# Patient Record
Sex: MALE | Race: WHITE | NOT HISPANIC OR LATINO
[De-identification: names, ages, dates, MRNs, and addresses within clinical notes are randomized per-mention and may not be internally consistent; named-entity substitution may affect disease eponyms.]

---

## 2019-11-04 ENCOUNTER — APPOINTMENT (OUTPATIENT)
Dept: ORTHOPEDIC SURGERY | Facility: CLINIC | Age: 39
End: 2019-11-04
Payer: COMMERCIAL

## 2019-11-04 DIAGNOSIS — M25.562 PAIN IN RIGHT KNEE: ICD-10-CM

## 2019-11-04 DIAGNOSIS — M25.561 PAIN IN RIGHT KNEE: ICD-10-CM

## 2019-11-04 PROBLEM — Z00.00 ENCOUNTER FOR PREVENTIVE HEALTH EXAMINATION: Status: ACTIVE | Noted: 2019-11-04

## 2019-11-04 PROCEDURE — 99204 OFFICE O/P NEW MOD 45 MIN: CPT

## 2019-11-04 NOTE — DISCUSSION/SUMMARY
[de-identified] : Patient is sent for an MRI. With like to evaluate him for possible lateral meniscal tear. She will device once the MRI is available for

## 2019-11-04 NOTE — HISTORY OF PRESENT ILLNESS
[de-identified] : C/O LEFT KNEE STIFFNESS AND SWELLING - HAD ACL REPAIR 14 YRS AGO \par \par SENT FOR NEW [facility]RAYS AT [facility] Patient an ACL reconstruction as well as prostate 3 years ago. He is very active with soccer states his left knee is now causing pain was selected based on this

## 2019-11-04 NOTE — PHYSICAL EXAM
[de-identified] : Left knee on exam today range of motion is 0-130°. There is significant quadriceps atrophy left as compared to right. He is neurovascularly intact distally. Previous ACL incision is well-healed. There is no evidence of anterior or posterior instability. There's definite anterolateral joint line tenderness positive Maru sign. [de-identified] : AP standing individual lateral and sunrise views are obtained of both knees. Left knee is only minimal joint space narrowing on the medial compartment of the left.